# Patient Record
Sex: FEMALE | Race: WHITE | Employment: OTHER | URBAN - METROPOLITAN AREA
[De-identification: names, ages, dates, MRNs, and addresses within clinical notes are randomized per-mention and may not be internally consistent; named-entity substitution may affect disease eponyms.]

---

## 2019-04-09 ENCOUNTER — HOSPITAL ENCOUNTER (OUTPATIENT)
Age: 84
Discharge: HOME | End: 2019-04-09
Payer: MEDICARE

## 2019-04-09 ENCOUNTER — HOSPITAL ENCOUNTER (OUTPATIENT)
Dept: WOUND CARE | Age: 84
Discharge: HOME | End: 2019-04-09
Payer: MEDICARE

## 2019-04-09 VITALS
RESPIRATION RATE: 16 BRPM | TEMPERATURE: 96.9 F | HEART RATE: 60 BPM | SYSTOLIC BLOOD PRESSURE: 125 MMHG | DIASTOLIC BLOOD PRESSURE: 57 MMHG

## 2019-04-09 DIAGNOSIS — L97.912 ULCER OF LEG, CHRONIC, RIGHT, WITH FAT LAYER EXPOSED (HCC): Primary | ICD-10-CM

## 2019-04-09 DIAGNOSIS — L97.212 NON-PRESSURE CHRONIC ULCER OF RIGHT CALF WITH FAT LAYER EXPOSED (HCC): ICD-10-CM

## 2019-04-09 DIAGNOSIS — I10 ESSENTIAL HYPERTENSION, MALIGNANT: ICD-10-CM

## 2019-04-09 DIAGNOSIS — L03.116 CELLULITIS OF LEFT FOOT: ICD-10-CM

## 2019-04-09 PROBLEM — L03.115 CELLULITIS OF RIGHT FOOT: Status: ACTIVE | Noted: 2019-04-09

## 2019-04-09 LAB
ANION GAP SERPL CALCULATED.3IONS-SCNC: 12 MMOL/L (ref 3–16)
BUN BLDV-MCNC: 46 MG/DL (ref 7–20)
CALCIUM SERPL-MCNC: 10.8 MG/DL (ref 8.3–10.6)
CHLORIDE BLD-SCNC: 100 MMOL/L (ref 99–110)
CO2: 26 MMOL/L (ref 21–32)
CREAT SERPL-MCNC: 1.2 MG/DL (ref 0.6–1.2)
GFR AFRICAN AMERICAN: 51
GFR NON-AFRICAN AMERICAN: 42
GLUCOSE BLD-MCNC: 101 MG/DL (ref 70–99)
HCT VFR BLD CALC: 35.2 % (ref 36–48)
HEMOGLOBIN: 11.6 G/DL (ref 12–16)
MCH RBC QN AUTO: 31 PG (ref 26–34)
MCHC RBC AUTO-ENTMCNC: 33.1 G/DL (ref 31–36)
MCV RBC AUTO: 93.6 FL (ref 80–100)
PDW BLD-RTO: 18.2 % (ref 12.4–15.4)
PLATELET # BLD: 188 K/UL (ref 135–450)
PMV BLD AUTO: 9.7 FL (ref 5–10.5)
POTASSIUM SERPL-SCNC: 4.2 MMOL/L (ref 3.5–5.1)
RBC # BLD: 3.76 M/UL (ref 4–5.2)
SODIUM BLD-SCNC: 138 MMOL/L (ref 136–145)
WBC # BLD: 10.6 K/UL (ref 4–11)

## 2019-04-09 PROCEDURE — 99213 OFFICE O/P EST LOW 20 MIN: CPT

## 2019-04-09 PROCEDURE — 29581 APPL MULTLAYER CMPRN SYS LEG: CPT

## 2019-04-09 PROCEDURE — 80048 BASIC METABOLIC PNL TOTAL CA: CPT

## 2019-04-09 PROCEDURE — 85027 COMPLETE CBC AUTOMATED: CPT

## 2019-04-09 PROCEDURE — 36415 COLL VENOUS BLD VENIPUNCTURE: CPT

## 2019-04-09 PROCEDURE — 83036 HEMOGLOBIN GLYCOSYLATED A1C: CPT

## 2019-04-09 RX ORDER — BUSPIRONE HYDROCHLORIDE 10 MG/1
5 TABLET ORAL PRN
COMMUNITY

## 2019-04-09 RX ORDER — TRAMADOL HYDROCHLORIDE 50 MG/1
50 TABLET ORAL 2 TIMES DAILY
COMMUNITY

## 2019-04-09 RX ORDER — LIDOCAINE HYDROCHLORIDE 40 MG/ML
SOLUTION TOPICAL ONCE
Status: DISCONTINUED | OUTPATIENT
Start: 2019-04-09 | End: 2019-04-10 | Stop reason: HOSPADM

## 2019-04-09 RX ORDER — DOXYCYCLINE HYCLATE 100 MG/1
100 CAPSULE ORAL 2 TIMES DAILY
Qty: 20 CAPSULE | Refills: 0 | Status: SHIPPED | OUTPATIENT
Start: 2019-04-09 | End: 2019-04-19

## 2019-04-09 RX ORDER — LANOLIN ALCOHOL/MO/W.PET/CERES
1000 CREAM (GRAM) TOPICAL DAILY
COMMUNITY

## 2019-04-09 RX ORDER — ATORVASTATIN CALCIUM 40 MG/1
40 TABLET, FILM COATED ORAL DAILY
COMMUNITY

## 2019-04-09 RX ORDER — ATENOLOL 50 MG/1
25 TABLET ORAL DAILY
COMMUNITY

## 2019-04-09 RX ORDER — CHLORAL HYDRATE 500 MG
1000 CAPSULE ORAL 3 TIMES DAILY
COMMUNITY

## 2019-04-09 RX ORDER — SPIRONOLACTONE 25 MG/1
12.5 TABLET ORAL DAILY
COMMUNITY

## 2019-04-09 RX ORDER — FUROSEMIDE 40 MG/1
20 TABLET ORAL 2 TIMES DAILY
COMMUNITY

## 2019-04-09 RX ORDER — ALLOPURINOL 100 MG/1
100 TABLET ORAL DAILY
COMMUNITY

## 2019-04-09 NOTE — H&P
[place] [address]                     [facility], [address]                              HISTORY AND PHYSICAL    PATIENT NAME: Pranav Patel                  :        1926  MED REC NO:   [id number]                          ROOM:  ACCOUNT NO:   [de-identified]                           ADMIT DATE: 2019  PROVIDER:     Xochitl Young MD    CHIEF REASON:  Bilateral leg redness and ulcer of the right lateral leg. HISTORY OF PRESENT ILLNESS:  The patient is a 80years of age white  female who is hypertensive, no diabetes. Came with bilateral leg  swelling. Right leg also has ulcers on the right lateral leg. She is  hypertensive. ALLERGIES:  SULFA. MEDICATIONS:  Takes Tenormin 50 mg daily, Ultram 50 mg, aspirin 81 mg,  Zyloprim 100 mg, Aldactone 25 mg, omega-3 fatty acids, multivitamins,  B12, Lipitor 40 mg. PAST SURGICAL HISTORY:  She has had several heart bypasses done before  with 7 stents in place. SOCIAL HISTORY:  Does not smoke or drink. PHYSICAL EXAMINATION:  GENERAL:  Reveals a female of stated age, alert and coherent. HEENT:  Head and neck appears normal.  CARDIOVASCULAR:  Heart sounds are heard. LUNGS:  Appear clear. ABDOMEN:  Protuberant with fat but no masses. EXTREMITIES:  Peripheral pulses are not felt but heard well with  Doppler. Both legs are red and swollen. The right lateral leg also has  ulcers on it, which seems to be trophic ulcers from pressure. IMPRESSION:  Bilateral cellulitis of the legs with ulcers right lower  leg, hypertension, comorbid condition. PLAN:  1. I will keep her on Vibramycin 100 mg twice a day for 10 days and  Xeroform and Unna boot with light pressure on them. 2.  Will come back in a week for a recheck. 3.  We will also check hemoglobin A1c, CBC, and metabolic profile.         Eduarda Jenkins MD    D: 2019 10:43:15       T: 2019 11:30:54     [email or web address]  Job#: [id number]     Doc#: [id number]    CC:

## 2019-04-09 NOTE — PLAN OF CARE
Discharge instructions given. Patient verbalized understanding. Return to [facility] in 1 week.   Called/faxed orders to  Lab, pharmacy, [facility]

## 2019-04-09 NOTE — PROGRESS NOTES
Ruben Mccain  Progress Note       Naila Mcduffie  AGE: 80 y.o. GENDER: female  : 1926  TODAY'S DATE:  2019    Subjective:     Chief Complaint   Patient presents with    Wound Check     Right foot         HISTORY of PRESENT ILLNESS HPI     Naila Mcduffie is a 80 y.o. female who presents today for wound evaluation. History of Wound: Ulcer rt foot bilateral cellulitis     Wound Pain:  none  Severity:  0 / 10   Wound Type:  pressure and lymphedema  Modifying Factors:  lymphedema  Associated Signs/Symptoms:  edema, erythema and drainage        PAST MEDICAL HISTORY        Diagnosis Date    Arthritis     CAD (coronary artery disease)     Cancer ([facility] [address]     skin cancer scalp    Cerebral artery occlusion with cerebral infarction ([facility] [address]     CHF (congestive heart failure) ([facility] [address]     Glaucoma     Hyperlipidemia     Hypertension        PAST SURGICAL HISTORY    Past Surgical History:   Procedure Laterality Date    ABDOMEN SURGERY      Hernia repair    BREAST SURGERY      benign mass removed    CORONARY ARTERY BYPASS GRAFT      HYSTERECTOMY         FAMILY HISTORY    History reviewed. No pertinent family history. SOCIAL HISTORY    Social History     Tobacco Use    Smoking status: Former Smoker     Last attempt to quit:      Years since quittin.3    Smokeless tobacco: Never Used   Substance Use Topics    Alcohol use: Not on file    Drug use: Not on file       ALLERGIES    Allergies   Allergen Reactions    Codeine     Sulfa Antibiotics        MEDICATIONS    Current Outpatient Medications on File Prior to Encounter   Medication Sig Dispense Refill    traMADol (ULTRAM) 50 MG tablet Take 50 mg by mouth 2 times daily.       atenolol (TENORMIN) 50 MG tablet Take 50 mg by mouth daily      furosemide (LASIX) 40 MG tablet Take 40 mg by mouth 2 times daily      aspirin 81 MG tablet Take 81 mg by mouth daily      allopurinol (ZYLOPRIM) 100 MG tablet Take 100 mg by mouth daily      spironolactone (ALDACTONE) 25 MG tablet Take 12.5 mg by mouth daily      Omega-3 Fatty Acids (FISH OIL) 1000 MG CAPS Take 1,000 mg by mouth 3 times daily      Multiple Vitamins-Minerals (CENTRUM SILVER 50+WOMEN) TABS Take by mouth      vitamin B-12 (CYANOCOBALAMIN) 1000 MCG tablet Take 1,000 mcg by mouth daily      atorvastatin (LIPITOR) 40 MG tablet Take 40 mg by mouth daily      busPIRone (BUSPAR) 10 MG tablet Take 10 mg by mouth as needed (anxiety)       No current facility-administered medications on file prior to encounter. REVIEW OF SYSTEMS    Pertinent items are noted in HPI.       Objective:      BP (!) 125/57   Pulse 60   Temp 96.9 °F (36.1 °C) (Oral)   Resp 16     PHYSICAL EXAM    Both legs swollen red ulcers rt leg Pulses heard well with doppler       Assessment:     Patient Active Problem List   Diagnosis    Cellulitis of right foot    Cellulitis of left foot    Essential hypertension, malignant    Non-pressure chronic ulcer of right calf with fat layer exposed ([facility] [address]       Wound 04/09/19 Foot Right;Lateral #1 (Active)   Wound Image   4/9/2019  9:55 AM   Wound Venous 4/9/2019  9:55 AM   Wound Cleansed Wound cleanser 4/9/2019  9:55 AM   Wound Length (cm) 1 cm 4/9/2019  9:55 AM   Wound Width (cm) 0.5 cm 4/9/2019  9:55 AM   Wound Depth (cm) 0.1 cm 4/9/2019  9:55 AM   Wound Surface Area (cm^2) 0.5 cm^2 4/9/2019  9:55 AM   Wound Volume (cm^3) 0.05 cm^3 4/9/2019  9:55 AM   Wound Assessment Pink 4/9/2019  9:55 AM   Drainage Amount Small 4/9/2019  9:55 AM   Drainage Description Serosanguinous 4/9/2019  9:55 AM   Odor None 4/9/2019  9:55 AM   Mckayla-wound Assessment Excoriated;Red 4/9/2019  9:55 AM   [place]%Wound Bed 100 4/9/2019  9:55 AM   Red%Wound Bed 0 4/9/2019  9:55 AM   Yellow%Wound Bed 0 4/9/2019  9:55 AM   Black%Wound Bed 0 4/9/2019  9:55 AM   Purple%Wound Bed 0 4/9/2019  9:55 AM   Other%Wound Bed 0 4/9/2019  9:55 AM   Number of days: 0           Plan:       Discharge Treatment Xeroform and padding bilateral unna boot.  Vibramycin 100mg Bid for 10 days RT in 1 week        Written Patient Discharge Instructions Given            Electronically signed by Nelly Mendoza MD on 4/9/2019 at 10:33 AM

## 2019-04-09 NOTE — PROGRESS NOTES
Compression Application   Below Knee    NAME:  Sylvester Ross  YOB: 1926  MEDICAL RECORD NUMBER:  [id number]  DATE:  4/9/2019       Applied moisturizing agent to dry skin as needed. Appied primary and secondary dressing as ordered     Applied  2 layer wrap from toes to knee overlapping each time. Applied cast padding and coban from toes to below the knee. Instructed patient/caregiver to keep dressing dry and intact. DO NOT REMOVE DRESSING. Instructed pt/family/caregiver to report excessive draining, loose bandage, wet dressing, severe pain or tingling in toes. Applied Compression dressing below the knee to Bilateral lower leg(s)      Compression wrap(s) were applied per  Guidelines.      Electronically signed by Daniella Grady RN on 4/9/2019 at 10:58 AM

## 2019-04-10 LAB
ESTIMATED AVERAGE GLUCOSE: 157.1 MG/DL
HBA1C MFR BLD: 7.1 %

## 2019-04-16 ENCOUNTER — HOSPITAL ENCOUNTER (OUTPATIENT)
Dept: WOUND CARE | Age: 84
Discharge: HOME | End: 2019-04-16
Attending: SURGERY
Payer: MEDICARE

## 2019-04-16 VITALS
SYSTOLIC BLOOD PRESSURE: 122 MMHG | OXYGEN SATURATION: 96 % | HEART RATE: 61 BPM | RESPIRATION RATE: 16 BRPM | TEMPERATURE: 96.8 F | DIASTOLIC BLOOD PRESSURE: 58 MMHG

## 2019-04-16 PROCEDURE — 29581 APPL MULTLAYER CMPRN SYS LEG: CPT

## 2019-04-16 PROCEDURE — 99213 OFFICE O/P EST LOW 20 MIN: CPT | Performed by: SURGERY

## 2019-04-16 RX ORDER — DORZOLAMIDE HCL 20 MG/ML
1 SOLUTION/ DROPS OPHTHALMIC 2 TIMES DAILY
COMMUNITY

## 2019-04-16 RX ORDER — LIDOCAINE 50 MG/G
OINTMENT TOPICAL ONCE
Status: DISCONTINUED | OUTPATIENT
Start: 2019-04-16 | End: 2019-04-17 | Stop reason: HOSPADM

## 2019-04-16 RX ORDER — LATANOPROST 50 UG/ML
1 SOLUTION/ DROPS OPHTHALMIC NIGHTLY
COMMUNITY

## 2019-04-16 RX ORDER — CLOBETASOL PROPIONATE 0.5 MG/G
CREAM TOPICAL PRN
COMMUNITY

## 2019-04-16 NOTE — PROGRESS NOTES
Ruben Mccain  Progress Note       Libby Cruz  AGE: 80 y.o. GENDER: female  : 1926  TODAY'S DATE:  2019    Subjective:     Chief Complaint   Patient presents with    Wound Check     RLE         HISTORY of PRESENT ILLNESS HPI     Libby Cruz is a 80 y.o. female who presents today for wound evaluation. History of Wound: venous ulcer    Wound Pain:  mild  Severity:  2 / 10   Wound Type:  venous  Modifying Factors:  venous stasis  Associated Signs/Symptoms:  none        PAST MEDICAL HISTORY        Diagnosis Date    Arthritis     CAD (coronary artery disease)     Cancer (HCC)     skin cancer scalp    Cerebral artery occlusion with cerebral infarction ([facility] [address]     CHF (congestive heart failure) ([facility] [address]     Glaucoma     Hyperlipidemia     Hypertension        PAST SURGICAL HISTORY    Past Surgical History:   Procedure Laterality Date    ABDOMEN SURGERY      Hernia repair    BREAST SURGERY      benign mass removed    CORONARY ARTERY BYPASS GRAFT      HYSTERECTOMY         FAMILY HISTORY    No family history on file.     SOCIAL HISTORY    Social History     Tobacco Use    Smoking status: Former Smoker     Last attempt to quit:      Years since quittin.3    Smokeless tobacco: Never Used   Substance Use Topics    Alcohol use: Not on file    Drug use: Not on file       ALLERGIES    Allergies   Allergen Reactions    Codeine     Sulfa Antibiotics        MEDICATIONS    Current Outpatient Medications on File Prior to Encounter   Medication Sig Dispense Refill    brimonidine (ALPHAGAN P) 0.1 % SOLN Place 1 drop into both eyes 2 times daily      dorzolamide (TRUSOPT) 2 % ophthalmic solution Place 1 drop into both eyes 2 times daily      latanoprost (XALATAN) 0.005 % ophthalmic solution Place 1 drop into both eyes nightly      polyethyl glycol-propyl glycol 0.4-0.3 % (SYSTANE) 0.4-0.3 % ophthalmic solution 1 drop as needed for Dry Eyes      econazole nitrate 1 % cream Apply topically 2 times daily Apply topically daily.  clobetasol (TEMOVATE) 0.05 % cream Apply topically as needed Apply topically 2 times daily.  traMADol (ULTRAM) 50 MG tablet Take 50 mg by mouth 2 times daily.  atenolol (TENORMIN) 50 MG tablet Take 25 mg by mouth daily       furosemide (LASIX) 40 MG tablet Take 20 mg by mouth 2 times daily       aspirin 81 MG tablet Take 81 mg by mouth daily      allopurinol (ZYLOPRIM) 100 MG tablet Take 100 mg by mouth daily      spironolactone (ALDACTONE) 25 MG tablet Take 12.5 mg by mouth daily      busPIRone (BUSPAR) 10 MG tablet Take 5 mg by mouth as needed (anxiety)       Omega-3 Fatty Acids (FISH OIL) 1000 MG CAPS Take 1,000 mg by mouth 3 times daily      Multiple Vitamins-Minerals (CENTRUM SILVER 50+WOMEN) TABS Take by mouth      vitamin B-12 (CYANOCOBALAMIN) 1000 MCG tablet Take 1,000 mcg by mouth daily      atorvastatin (LIPITOR) 40 MG tablet Take 40 mg by mouth daily      doxycycline hyclate (VIBRAMYCIN) 100 MG capsule Take 1 capsule by mouth 2 times daily for 10 days 20 capsule 0     No current facility-administered medications on file prior to encounter. REVIEW OF SYSTEMS    Pertinent items are noted in HPI.       Objective:      BP (!) 122/58   Pulse 61   Temp 96.8 °F (36 °C) (Oral)   Resp 16   SpO2 96%     PHYSICAL EXAM    General Appearance: alert and oriented to person, place and time, well-developed and well-nourished, in no acute distress  Skin: warm and dry, no rash or erythema  Head: normocephalic and atraumatic  Neck: neck supple and non tender without mass, no thyromegaly or thyroid nodules, no cervical lymphadenopathy   Abdomen: soft, non-tender, non-distended, normal bowel sounds, no masses or organomegaly  Swelling under good control    Assessment:     Patient Active Problem List   Diagnosis    Cellulitis of right foot    Cellulitis of left foot    Essential hypertension, malignant    Non-pressure chronic ulcer of right calf with fat layer exposed ([facility] [address]       Wound 04/09/19 Foot Right;Lateral #1 (Active)   Wound Image   4/9/2019  9:55 AM   Wound Venous 4/16/2019 10:31 AM   Wound Cleansed Rinsed/Irrigated with saline; Wound cleanser 4/16/2019 10:31 AM   Wound Length (cm) 1.3 cm 4/16/2019 10:31 AM   Wound Width (cm) 0.9 cm 4/16/2019 10:31 AM   Wound Depth (cm) 0.1 cm 4/16/2019 10:31 AM   Wound Surface Area (cm^2) 1.17 cm^2 4/16/2019 10:31 AM   Change in Wound Size % (l*w) -134 4/16/2019 10:31 AM   Wound Volume (cm^3) 0.12 cm^3 4/16/2019 10:31 AM   Wound Healing % -140 4/16/2019 10:31 AM   Wound Assessment Pink;Yellow 4/16/2019 10:31 AM   Drainage Amount Moderate 4/16/2019 10:31 AM   Drainage Description Serosanguinous 4/16/2019 10:31 AM   Odor None 4/16/2019 10:31 AM   Mckayla-wound Assessment Excoriated;Pink 4/16/2019 10:31 AM   [place]%Wound Bed 80 4/16/2019 10:31 AM   Red%Wound Bed 0 4/16/2019 10:31 AM   Yellow%Wound Bed 20 4/16/2019 10:31 AM   Black%Wound Bed 0 4/16/2019 10:31 AM   Purple%Wound Bed 0 4/16/2019 10:31 AM   Other%Wound Bed 0 4/16/2019 10:31 AM   Number of days: 9    80year-old female with venous ulcerations of the right ankle. Her swelling is under good control with a Coban light. The ulcers are clean. Plan:     The patient will wear double layer Tubigrip on the left leg and a new Coban light was placed on the right leg. Follow up in the wound center in 1 week.     Discharge Treatment          Written Patient Discharge Instructions Given            Electronically signed by Catalina Riggins MD on 4/16/2019 at 11:20 AM

## 2019-04-16 NOTE — PLAN OF CARE
Discharge instructions given. Patient verbalized understanding. Return to [facility] in 1 week.

## 2019-04-23 ENCOUNTER — HOSPITAL ENCOUNTER (OUTPATIENT)
Dept: WOUND CARE | Age: 84
Discharge: HOME | End: 2019-04-23
Payer: MEDICARE

## 2019-04-23 VITALS
TEMPERATURE: 97.9 F | RESPIRATION RATE: 16 BRPM | DIASTOLIC BLOOD PRESSURE: 50 MMHG | HEART RATE: 61 BPM | SYSTOLIC BLOOD PRESSURE: 106 MMHG

## 2019-04-23 PROCEDURE — 29580 STRAPPING UNNA BOOT: CPT

## 2019-04-23 NOTE — PLAN OF CARE
Discharge instructions given. Patient verbalized understanding. Return to [address] [place] [address],[address] in 1 week.   Called/faxed orders to  [facility]

## 2019-04-30 ENCOUNTER — HOSPITAL ENCOUNTER (OUTPATIENT)
Dept: WOUND CARE | Age: 84
Discharge: HOME | End: 2019-04-30
Payer: MEDICARE

## 2019-04-30 VITALS — SYSTOLIC BLOOD PRESSURE: 138 MMHG | DIASTOLIC BLOOD PRESSURE: 66 MMHG | HEART RATE: 60 BPM | RESPIRATION RATE: 16 BRPM

## 2019-04-30 PROCEDURE — 29580 STRAPPING UNNA BOOT: CPT

## 2019-04-30 NOTE — PROGRESS NOTES
Ruben Mccain  Progress Note       Amanda Prasad  AGE: 80 y.o. GENDER: female  : 1926  TODAY'S DATE:  2019    Subjective:     Chief Complaint   Patient presents with    Wound Check     Right foot         HISTORY of PRESENT ILLNESS HPI     Amanda Prasad is a 80 y.o. female who presents today for wound evaluation. History of Wound: Rt foot ulcer     Wound Pain:  constant  Severity:  2 / 10   Wound Type:  traumatic  Modifying Factors:  lymphedema and arterial insufficiency  Associated Signs/Symptoms:  erythema and drainage        PAST MEDICAL HISTORY        Diagnosis Date    Arthritis     CAD (coronary artery disease)     Cancer ([place] [address]     skin cancer scalp    Cerebral artery occlusion with cerebral infarction ([facility] [address]     CHF (congestive heart failure) ([facility] [address]     Glaucoma     Hyperlipidemia     Hypertension        PAST SURGICAL HISTORY    Past Surgical History:   Procedure Laterality Date    ABDOMEN SURGERY      Hernia repair    BREAST SURGERY      benign mass removed    CORONARY ARTERY BYPASS GRAFT      HYSTERECTOMY         FAMILY HISTORY    History reviewed. No pertinent family history.     SOCIAL HISTORY    Social History     Tobacco Use    Smoking status: Former Smoker     Last attempt to quit:      Years since quittin.3    Smokeless tobacco: Never Used   Substance Use Topics    Alcohol use: Not on file    Drug use: Not on file       ALLERGIES    Allergies   Allergen Reactions    Codeine     Sulfa Antibiotics        MEDICATIONS    Current Outpatient Medications on File Prior to Encounter   Medication Sig Dispense Refill    brimonidine (ALPHAGAN P) 0.1 % SOLN Place 1 drop into both eyes 2 times daily      dorzolamide (TRUSOPT) 2 % ophthalmic solution Place 1 drop into both eyes 2 times daily      latanoprost (XALATAN) 0.005 % ophthalmic solution Place 1 drop into both eyes nightly      econazole nitrate 1 % cream Apply topically 2 times daily Wound Volume (cm^3) 0.04 cm^3 4/30/2019  2:28 PM   Wound Healing % 20 4/30/2019  2:28 PM   Wound Assessment Red 4/30/2019  2:28 PM   Drainage Amount Moderate 4/30/2019  2:28 PM   Drainage Description Serosanguinous 4/30/2019  2:28 PM   Odor None 4/30/2019  2:28 PM   Mckayla-wound Assessment Pink 4/30/2019  2:28 PM   [place]%Wound Bed 0 4/30/2019  2:28 PM   Red%Wound Bed 100 4/30/2019  2:28 PM   Yellow%Wound Bed 0 4/30/2019  2:28 PM   Black%Wound Bed 0 4/30/2019  2:28 PM   Purple%Wound Bed 0 4/30/2019  2:28 PM   Other%Wound Bed 0 4/30/2019  2:28 PM   Number of days: 21           Plan:       Discharge Treatment  Xeroform Padding and unna boot Rt in 1 week        Written Patient Discharge Instructions Given            Electronically signed by Xochitl Young MD on 4/30/2019 at 2:43 PM

## 2019-05-07 ENCOUNTER — HOSPITAL ENCOUNTER (OUTPATIENT)
Dept: WOUND CARE | Age: 84
Discharge: HOME | End: 2019-05-07
Attending: SURGERY
Payer: MEDICARE

## 2019-05-07 VITALS — DIASTOLIC BLOOD PRESSURE: 66 MMHG | SYSTOLIC BLOOD PRESSURE: 129 MMHG | RESPIRATION RATE: 16 BRPM | HEART RATE: 56 BPM

## 2019-05-07 PROCEDURE — 99213 OFFICE O/P EST LOW 20 MIN: CPT | Performed by: SURGERY

## 2019-05-07 PROCEDURE — 29580 STRAPPING UNNA BOOT: CPT

## 2019-05-07 RX ORDER — LIDOCAINE 40 MG/G
CREAM TOPICAL ONCE
Status: DISCONTINUED | OUTPATIENT
Start: 2019-05-07 | End: 2019-05-08 | Stop reason: HOSPADM

## 2019-05-07 NOTE — FLOWSHEET NOTE
05/07/19 1130   Anesthetic   Anesthetic Other (comment)   Right Leg Edema Point of Measurement   Great toe to forefoot 12 cm   Heel to ankle 9 cm   Heel to calf 33   Leg circumference 35 cm  (38 cm from heel to back of knww)   Ankle circumference 19.5 cm   Foot circumference 24 cm   Left Leg Edema Point of Measurement   Great toe to forefoot 12 cm   Heel to ankle 9 cm   Heel to calf 33   Leg circumference 40.5 cm  (38 cm from to behind knee)   Ankle circumference 20 cm   Foot circumference 23 cm   Wound 04/09/19 Foot Right;Lateral #1   Date First Assessed/Time First Assessed: 04/09/19 1007   Primary Wound Type: Venous Ulcer  Location: Foot  Wound Location Orientation: Right;Lateral  Wound Description (Comments): #1   Wound Venous   Wound Cleansed Wound cleanser   Wound Length (cm) 1.4 cm   Wound Width (cm) 4 cm   Wound Depth (cm) 0.1 cm   Wound Surface Area (cm^2) 5.6 cm^2   Change in Wound Size % (l*w) -1020   Wound Volume (cm^3) 0.56 cm^3   Wound Healing % -1020   Wound Assessment Pink;Granulation tissue   Drainage Amount Moderate   Drainage Description Serosanguinous   Odor Mild   Mckayla-wound Assessment Marian[place]   Pink%Wound Bed 100   Red%Wound Bed 0   Yellow%Wound Bed 0   Black%Wound Bed 0   Purple%Wound Bed 0

## 2019-05-07 NOTE — PLAN OF CARE
Discharge instructions given. Patient verbalized understanding. Return to [facility] in 1 week.   Faxed to [facility]

## 2019-05-07 NOTE — PROGRESS NOTES
Ruben Mccain  Progress Note       Libby Cruz  AGE: 80 y.o. GENDER: female  : 1926  TODAY'S DATE:  2019    Subjective:     Chief Complaint   Patient presents with    Wound Check     Follow up wound          HISTORY of PRESENT ILLNESS HPI     Libby Cruz is a 80 y.o. female who presents today for wound evaluation. History of Wound: venous ulcer R foot    Wound Pain:  mild  Severity:  2 / 10   Wound Type:  venous  Modifying Factors:  venous stasis  Associated Signs/Symptoms:  none        PAST MEDICAL HISTORY        Diagnosis Date    Arthritis     CAD (coronary artery disease)     Cancer (HCC)     skin cancer scalp    Cerebral artery occlusion with cerebral infarction ([facility] [address]     CHF (congestive heart failure) ([facility] [address]     Glaucoma     Hyperlipidemia     Hypertension        PAST SURGICAL HISTORY    Past Surgical History:   Procedure Laterality Date    ABDOMEN SURGERY      Hernia repair    BREAST SURGERY      benign mass removed    CORONARY ARTERY BYPASS GRAFT      HYSTERECTOMY         FAMILY HISTORY    No family history on file.     SOCIAL HISTORY    Social History     Tobacco Use    Smoking status: Former Smoker     Last attempt to quit:      Years since quittin.3    Smokeless tobacco: Never Used   Substance Use Topics    Alcohol use: Not on file    Drug use: Not on file       ALLERGIES    Allergies   Allergen Reactions    Codeine     Sulfa Antibiotics        MEDICATIONS    Current Outpatient Medications on File Prior to Encounter   Medication Sig Dispense Refill    brimonidine (ALPHAGAN P) 0.1 % SOLN Place 1 drop into both eyes 2 times daily      dorzolamide (TRUSOPT) 2 % ophthalmic solution Place 1 drop into both eyes 2 times daily      latanoprost (XALATAN) 0.005 % ophthalmic solution Place 1 drop into both eyes nightly      polyethyl glycol-propyl glycol 0.4-0.3 % (SYSTANE) 0.4-0.3 % ophthalmic solution 1 drop as needed for Dry Eyes      econazole nitrate 1 % cream Apply topically 2 times daily Apply topically daily.  clobetasol (TEMOVATE) 0.05 % cream Apply topically as needed Apply topically 2 times daily.  traMADol (ULTRAM) 50 MG tablet Take 50 mg by mouth 2 times daily.  atenolol (TENORMIN) 50 MG tablet Take 25 mg by mouth daily       furosemide (LASIX) 40 MG tablet Take 20 mg by mouth 2 times daily       aspirin 81 MG tablet Take 81 mg by mouth daily      allopurinol (ZYLOPRIM) 100 MG tablet Take 100 mg by mouth daily      spironolactone (ALDACTONE) 25 MG tablet Take 12.5 mg by mouth daily      busPIRone (BUSPAR) 10 MG tablet Take 5 mg by mouth as needed (anxiety)       Omega-3 Fatty Acids (FISH OIL) 1000 MG CAPS Take 1,000 mg by mouth 3 times daily      Multiple Vitamins-Minerals (CENTRUM SILVER 50+WOMEN) TABS Take by mouth      vitamin B-12 (CYANOCOBALAMIN) 1000 MCG tablet Take 1,000 mcg by mouth daily      atorvastatin (LIPITOR) 40 MG tablet Take 40 mg by mouth daily       No current facility-administered medications on file prior to encounter. REVIEW OF SYSTEMS    Pertinent items are noted in HPI.       Objective:      /66   Pulse 56   Resp 16     PHYSICAL EXAM    General Appearance: alert and oriented to person, place and time, well-developed and well-nourished, in no acute distress  Skin: warm and dry, no rash or erythema  Head: normocephalic and atraumatic  Neck: neck supple and non tender without mass, no thyromegaly or thyroid nodules, no cervical lymphadenopathy   Abdomen: soft, non-tender, non-distended, normal bowel sounds, no masses or organomegaly  Clean ulceration of the lateral aspect of the R foot    Assessment:     Patient Active Problem List   Diagnosis    Cellulitis of right foot    Cellulitis of left foot    Essential hypertension, malignant    Non-pressure chronic ulcer of right calf with fat layer exposed ([facility] [address]       Wound 04/09/19 Foot Right;Lateral #1 (Active)   Wound Image 4/9/2019  9:55 AM   Wound Venous 5/7/2019 11:30 AM   Wound Cleansed Wound cleanser 5/7/2019 11:30 AM   Wound Length (cm) 1.4 cm 5/7/2019 11:30 AM   Wound Width (cm) 4 cm 5/7/2019 11:30 AM   Wound Depth (cm) 0.1 cm 5/7/2019 11:30 AM   Wound Surface Area (cm^2) 5.6 cm^2 5/7/2019 11:30 AM   Change in Wound Size % (l*w) -1020 5/7/2019 11:30 AM   Wound Volume (cm^3) 0.56 cm^3 5/7/2019 11:30 AM   Wound Healing % -1020 5/7/2019 11:30 AM   Wound Assessment Pink;Granulation tissue 5/7/2019 11:30 AM   Drainage Amount Moderate 5/7/2019 11:30 AM   Drainage Description Serosanguinous 5/7/2019 11:30 AM   Odor Mild 5/7/2019 11:30 AM   Mckayla-wound Assessment Pink 5/7/2019 11:30 AM   [place]%Wound Bed 100 5/7/2019 11:30 AM   Red%Wound Bed 0 5/7/2019 11:30 AM   Yellow%Wound Bed 0 5/7/2019 11:30 AM   Black%Wound Bed 0 5/7/2019 11:30 AM   Purple%Wound Bed 0 5/7/2019 11:30 AM   Other%Wound Bed 0 4/30/2019  2:28 PM   Number of days: 29      80year old female with a probable venous ulcer of the R lateral foot. Not making much progress. A new Unna boot was placed. Plan:     Follow up in one week. Would consider usage of a skin substitute.   Discharge Treatment          Written Patient Discharge Instructions Given            Electronically signed by Penelope Kanner, MD on 5/7/2019 at 11:45 AM

## 2019-05-14 ENCOUNTER — HOSPITAL ENCOUNTER (OUTPATIENT)
Dept: WOUND CARE | Age: 84
Discharge: HOME | End: 2019-05-14
Attending: SURGERY
Payer: MEDICARE

## 2019-05-14 DIAGNOSIS — L97.212 NON-PRESSURE CHRONIC ULCER OF RIGHT CALF WITH FAT LAYER EXPOSED (HCC): Primary | ICD-10-CM

## 2019-05-14 DIAGNOSIS — I83.009 VENOUS ULCER (HCC): ICD-10-CM

## 2019-05-14 DIAGNOSIS — L97.909 VENOUS ULCER (HCC): ICD-10-CM

## 2019-05-14 PROCEDURE — 99213 OFFICE O/P EST LOW 20 MIN: CPT

## 2019-05-14 PROCEDURE — 99213 OFFICE O/P EST LOW 20 MIN: CPT | Performed by: SURGERY

## 2019-05-14 RX ORDER — LIDOCAINE 40 MG/G
CREAM TOPICAL ONCE
Status: DISCONTINUED | OUTPATIENT
Start: 2019-05-14 | End: 2019-05-15 | Stop reason: HOSPADM

## 2019-05-14 NOTE — PROGRESS NOTES
Ruben Mccain  Progress Note       Libby Cruz  AGE: 80 y.o. GENDER: female  : 1926  TODAY'S DATE:  2019    Subjective:     Chief Complaint   Patient presents with    Wound Check     both legs F/U         HISTORY of PRESENT ILLNESS HPI     Libby Cruz is a 80 y.o. female who presents today for wound evaluation. History of Wound: venous ulcer    Wound Pain:  none  Severity:  0 / 10   Wound Type:  venous  Modifying Factors:  venous stasis  Associated Signs/Symptoms:  none        PAST MEDICAL HISTORY        Diagnosis Date    Arthritis     CAD (coronary artery disease)     Cancer (HCC)     skin cancer scalp    [facility]     CHF [facility]congestive heart failure) [facility]     Glaucoma     Hyperlipidemia     Hypertension        PAST SURGICAL HISTORY    Past Surgical History:   Procedure Laterality Date    ABDOMEN SURGERY      Hernia repair    BREAST SURGERY      benign mass removed    CORONARY ARTERY BYPASS GRAFT      HYSTERECTOMY         FAMILY HISTORY    History reviewed. No pertinent family history.     SOCIAL HISTORY    Social History     Tobacco Use    Smoking status: Former Smoker     Last attempt to quit:      Years since quittin.4    Smokeless tobacco: Never Used   Substance Use Topics    Alcohol use: Not on file    Drug use: Not on file       ALLERGIES    Allergies   Allergen Reactions    Codeine     Sulfa Antibiotics        MEDICATIONS    Current Outpatient Medications on File Prior to Encounter   Medication Sig Dispense Refill    brimonidine (ALPHAGAN P) 0.1 % SOLN Place 1 drop into both eyes 2 times daily      dorzolamide (TRUSOPT) 2 % ophthalmic solution Place 1 drop into both eyes 2 times daily      latanoprost (XALATAN) 0.005 % ophthalmic solution Place 1 drop into both eyes nightly      polyethyl glycol-propyl glycol 0.4-0.3 % (SYSTANE) 0.4-0.3 % ophthalmic solution 1 drop as needed for Dry Eyes      econazole nitrate 1 % cream Apply topically 2 times daily Apply topically daily.  clobetasol (TEMOVATE) 0.05 % cream Apply topically as needed Apply topically 2 times daily.  traMADol (ULTRAM) 50 MG tablet Take 50 mg by mouth 2 times daily.  atenolol (TENORMIN) 50 MG tablet Take 25 mg by mouth daily       furosemide (LASIX) 40 MG tablet Take 20 mg by mouth 2 times daily       aspirin 81 MG tablet Take 81 mg by mouth daily      allopurinol (ZYLOPRIM) 100 MG tablet Take 100 mg by mouth daily      spironolactone (ALDACTONE) 25 MG tablet Take 12.5 mg by mouth daily      busPIRone (BUSPAR) 10 MG tablet Take 5 mg by mouth as needed (anxiety)       Omega-3 Fatty Acids (FISH OIL) 1000 MG CAPS Take 1,000 mg by mouth 3 times daily      Multiple Vitamins-Minerals (CENTRUM SILVER 50+WOMEN) TABS Take by mouth      vitamin B-12 (CYANOCOBALAMIN) 1000 MCG tablet Take 1,000 mcg by mouth daily      atorvastatin (LIPITOR) 40 MG tablet Take 40 mg by mouth daily       No current facility-administered medications on file prior to encounter. REVIEW OF SYSTEMS    Pertinent items are noted in HPI. Objective: There were no vitals taken for this visit.     PHYSICAL EXAM    General Appearance: alert and oriented to person, place and time, well-developed and well-nourished, in no acute distress  Skin: warm and dry, no rash or erythema  Head: normocephalic and atraumatic  Neck: neck supple and non tender without mass, no thyromegaly or thyroid nodules, no cervical lymphadenopathy   Abdomen: soft, non-tender, non-distended, normal bowel sounds, no masses or organomegaly      Assessment:     Patient Active Problem List   Diagnosis    Cellulitis of right foot    Cellulitis of left foot    Essential hypertension, malignant    Non-pressure chronic ulcer of right calf with fat layer exposed ([facility] [address] [facility]   ulcer clean    Wound 04/09/19 Foot Right;Lateral #1 (Active)   Wound Image   4/9/2019  9:55 AM Wound Venous 5/14/2019 11:52 AM   Wound Cleansed Wound cleanser 5/14/2019 11:52 AM   Wound Length (cm) 1.6 cm 5/14/2019 11:52 AM   Wound Width (cm) 4 cm 5/14/2019 11:52 AM   Wound Depth (cm) 0.1 cm 5/14/2019 11:52 AM   Wound Surface Area (cm^2) 6.4 cm^2 5/14/2019 11:52 AM   Change in Wound Size % (l*w) -1180 5/14/2019 11:52 AM   Wound Volume (cm^3) 0.64 cm^3 5/14/2019 11:52 AM   Wound Healing % -1180 5/14/2019 11:52 AM   Wound Assessment Granulation tissue; Red 5/14/2019 11:52 AM   Drainage Amount Moderate 5/14/2019 11:52 AM   Drainage Description Tan 5/14/2019 11:52 AM   Odor Mild 5/7/2019 11:30 AM   Mckayla-wound Assessment Dry;Pink 5/14/2019 11:52 AM   [place]%Wound Bed 0 5/14/2019 11:52 AM   Red%Wound Bed 100 5/14/2019 11:52 AM   Yellow%Wound Bed 0 5/14/2019 11:52 AM   Black%Wound Bed 0 5/14/2019 11:52 AM   Purple%Wound Bed 0 5/14/2019 11:52 AM   Other%Wound Bed 0 5/14/2019 11:52 AM   Number of days: 28    80year-old female with venous stasis who is here for follow-up of a small right pretibial ulceration as well as a right lateral foot ulceration. Both ulcers are clean and progressing well. Plan: Adaptic followed by dry dressing and double layer Tubigrip. Change every other day. Follow-up in 1 week.   Discharge Treatment          Written Patient Discharge Instructions Given            Electronically signed by Johnathon Mc MD on 5/14/2019 at 12:15 PM

## 2019-05-14 NOTE — PLAN OF CARE
Discharge instructions given. Patient verbalized understanding. Return to [address] [place] [address],[address] in 1 week.   Called/faxed orders to [facility]

## 2019-05-21 ENCOUNTER — HOSPITAL ENCOUNTER (OUTPATIENT)
Dept: WOUND CARE | Age: 84
Discharge: HOME | End: 2019-05-21
Payer: MEDICARE

## 2019-05-21 VITALS — SYSTOLIC BLOOD PRESSURE: 142 MMHG | HEART RATE: 60 BPM | RESPIRATION RATE: 16 BRPM | DIASTOLIC BLOOD PRESSURE: 73 MMHG

## 2019-05-21 PROCEDURE — 99213 OFFICE O/P EST LOW 20 MIN: CPT

## 2019-05-21 NOTE — PROGRESS NOTES
Ruben Mccain  Progress Note       Kaylie Flynn  AGE: 80 y.o. GENDER: female  : 1926  TODAY'S DATE:  2019    Subjective:     Chief Complaint   Patient presents with    Wound Check     Leg wound         HISTORY of PRESENT ILLNESS HPI     Kaylie Flynn is a 80 y.o. female who presents today for wound evaluation. History of Wound: Rt foot and letral leg ulcer     Wound Pain:  none  Severity:  0 / 10   Wound Type:  pressure  Modifying Factors:  lymphedema, chronic pressure and decreased mobility  Associated Signs/Symptoms:  edema, erythema and drainage        PAST MEDICAL HISTORY        Diagnosis Date    Arthritis     CAD (coronary artery disease)     Cancer (HCC)     skin cancer scalp    [facility] occlusion with [facility] infarction ([facility]     CHF [facility]congestive heart failure) ([facility]     Glaucoma     Hyperlipidemia     Hypertension        PAST SURGICAL HISTORY    Past Surgical History:   Procedure Laterality Date    ABDOMEN SURGERY      Hernia repair    BREAST SURGERY      benign mass removed    CORONARY ARTERY BYPASS GRAFT      HYSTERECTOMY         FAMILY HISTORY    History reviewed. No pertinent family history.     SOCIAL HISTORY    Social History     Tobacco Use    Smoking status: Former Smoker     Last attempt to quit:      Years since quittin.4    Smokeless tobacco: Never Used   Substance Use Topics    Alcohol use: Not on file    Drug use: Not on file       ALLERGIES    Allergies   Allergen Reactions    Codeine     Sulfa Antibiotics        MEDICATIONS    Current Outpatient Medications on File Prior to Encounter   Medication Sig Dispense Refill    brimonidine (ALPHAGAN P) 0.1 % SOLN Place 1 drop into both eyes 2 times daily      dorzolamide (TRUSOPT) 2 % ophthalmic solution Place 1 drop into both eyes 2 times daily      latanoprost (XALATAN) 0.005 % ophthalmic solution Place 1 drop into both eyes nightly      polyethyl glycol-propyl glycol

## 2019-05-21 NOTE — PLAN OF CARE
Compression Application   Below Knee    NAME:  Naila Mcduffie  YOB: 1926  MEDICAL RECORD NUMBER:  [id number]  DATE:  5/21/2019       Applied moisturizing agent to dry skin as needed. Appied primary and secondary dressing as ordered     Applied  Unna Boot wrap from toes to knee overlapping each time. Applied cast padding and coban from toes to below the knee. Instructed patient/caregiver to keep dressing dry and intact. DO NOT REMOVE DRESSING. Instructed pt/family/caregiver to report excessive draining, loose bandage, wet dressing, severe pain or tingling in toes. Applied Compression dressing below the knee to Right lower leg(s)      Compression wrap(s) were applied per  Guidelines.      Electronically signed by Sulaiman Castelan RN on 5/21/2019 at 4:08 PM

## 2019-05-28 ENCOUNTER — HOSPITAL ENCOUNTER (OUTPATIENT)
Dept: WOUND CARE | Age: 84
Discharge: HOME | End: 2019-05-28
Payer: MEDICARE

## 2019-05-28 VITALS — RESPIRATION RATE: 16 BRPM | HEART RATE: 68 BPM | DIASTOLIC BLOOD PRESSURE: 70 MMHG | SYSTOLIC BLOOD PRESSURE: 121 MMHG

## 2019-05-28 PROCEDURE — 99213 OFFICE O/P EST LOW 20 MIN: CPT

## 2019-05-28 NOTE — PROGRESS NOTES
Ruben Mccain  Progress Note       William Velasquez  AGE: 80 y.o. GENDER: female  : 1926  TODAY'S DATE:  2019    Subjective:     Chief Complaint   Patient presents with    Wound Check     Right foot         HISTORY of PRESENT ILLNESS HPI     William Velasquez is a 80 y.o. female who presents today for wound evaluation. History of Wound: Rt foot ulcer     Wound Pain:  constant  Severity:  2 / 10   Wound Type:  pressure  Modifying Factors:  lymphedema, chronic pressure, decreased mobility and shear force  Associated Signs/Symptoms:  edema, erythema and drainage        PAST MEDICAL HISTORY        Diagnosis Date    Arthritis     CAD (coronary artery disease)     Cancer (HCC)     skin cancer scalp    [facility]erebral [facility] occlusion with [facility] infarction ([facility] [address] [facility]     CHF (congestive heart failure) ([facility] [facility]     Glaucoma     Hyperlipidemia     Hypertension        PAST SURGICAL HISTORY    Past Surgical History:   Procedure Laterality Date    ABDOMEN SURGERY      Hernia repair    BREAST SURGERY      benign mass removed    CORONARY ARTERY BYPASS GRAFT      HYSTERECTOMY         FAMILY HISTORY    History reviewed. No pertinent family history.     SOCIAL HISTORY    Social History     Tobacco Use    Smoking status: Former Smoker     Last attempt to quit:      Years since quittin.4    Smokeless tobacco: Never Used   Substance Use Topics    Alcohol use: Not on file    Drug use: Not on file       ALLERGIES    Allergies   Allergen Reactions    Codeine     Sulfa Antibiotics        MEDICATIONS    Current Outpatient Medications on File Prior to Encounter   Medication Sig Dispense Refill    brimonidine (ALPHAGAN P) 0.1 % SOLN Place 1 drop into both eyes 2 times daily      dorzolamide (TRUSOPT) 2 % ophthalmic solution Place 1 drop into both eyes 2 times daily      latanoprost (XALATAN) 0.005 % ophthalmic solution Place 1 drop into both eyes nightly      polyethyl glycol-propyl PM   Change in Wound Size % (l*w) -800 5/28/2019  3:15 PM   Wound Volume (cm^3) 0.45 cm^3 5/28/2019  3:15 PM   Wound Healing % -800 5/28/2019  3:15 PM   Wound Assessment Red;Granulation tissue 5/28/2019  3:15 PM   Drainage Amount Moderate 5/28/2019  3:15 PM   Drainage Description Tan 5/28/2019  3:15 PM   Odor Mild 5/28/2019  3:15 PM   Mckayla-wound Assessment Dry;Pink 5/28/2019  3:15 PM   [place]%Wound Bed 0 5/28/2019  3:15 PM   Red%Wound Bed 100 5/28/2019  3:15 PM   Yellow%Wound Bed 0 5/28/2019  3:15 PM   Black%Wound Bed 0 5/28/2019  3:15 PM   Purple%Wound Bed 0 5/28/2019  3:15 PM   Other%Wound Bed 0 5/28/2019  3:15 PM   Number of days: 49           Plan:       Discharge Treatment  silver alginate pading ,kerlix and to weat her circ aid over it Change q3 days RT in 1 week        Written Patient Discharge Instructions Given            Electronically signed by Radha Garcia MD on 5/28/2019 at 3:31 PM

## 2019-05-28 NOTE — PLAN OF CARE
Discharge instructions given. Patient verbalized understanding. Return to [facility] in 1 week.   Called/faxed orders to  [facility]

## 2019-06-04 ENCOUNTER — HOSPITAL ENCOUNTER (OUTPATIENT)
Dept: WOUND CARE | Age: 84
Discharge: HOME | End: 2019-06-04
Payer: MEDICARE

## 2019-06-04 VITALS — RESPIRATION RATE: 16 BRPM

## 2019-06-04 PROCEDURE — 29580 STRAPPING UNNA BOOT: CPT

## 2019-06-04 PROCEDURE — 99213 OFFICE O/P EST LOW 20 MIN: CPT | Performed by: SURGERY

## 2019-06-04 RX ORDER — LIDOCAINE 40 MG/G
CREAM TOPICAL ONCE
Status: DISCONTINUED | OUTPATIENT
Start: 2019-06-04 | End: 2019-06-05 | Stop reason: HOSPADM

## 2019-06-04 NOTE — PLAN OF CARE
Discharge instructions given. Patient verbalized understanding. Return to [address] [place] [address],[address] in 1 week.   Called/faxed orders to [facility] for skin sub auth

## 2019-06-04 NOTE — PROGRESS NOTES
Ruben Mccain  Progress Note       Yuil Recio  AGE: 80 y.o. GENDER: female  : 1926  TODAY'S DATE:  2019    Subjective:     Chief Complaint   Patient presents with    Wound Check     Foot wound follow up         HISTORY of PRESENT ILLNESS HPI     Yuli Recio is a 80 y.o. female who presents today for wound evaluation. History of Wound: venous ulcer    Wound Pain:  mild  Severity:  2 / 10   Wound Type:  venous  Modifying Factors:  venous stasis  Associated Signs/Symptoms:  none        PAST MEDICAL HISTORY        Diagnosis Date    Arthritis     CAD (coronary artery disease)     Cancer (HCC)     skin cancer scalp    Cerebral artery occlusion [facility] infarction ([facility] [address] [facility]     CHF (congestive heart failure) ([facility] [address] [facility]     Glaucoma     Hyperlipidemia     Hypertension        PAST SURGICAL HISTORY    Past Surgical History:   Procedure Laterality Date    ABDOMEN SURGERY      Hernia repair    BREAST SURGERY      benign mass removed    CORONARY ARTERY BYPASS GRAFT      HYSTERECTOMY         FAMILY HISTORY    History reviewed. No pertinent family history.     SOCIAL HISTORY    Social History     Tobacco Use    Smoking status: Former Smoker     Last attempt to quit:      Years since quittin.4    Smokeless tobacco: Never Used   Substance Use Topics    Alcohol use: Not on file    Drug use: Not on file       ALLERGIES    Allergies   Allergen Reactions    Codeine     Sulfa Antibiotics        MEDICATIONS    Current Outpatient Medications on File Prior to Encounter   Medication Sig Dispense Refill    brimonidine (ALPHAGAN P) 0.1 % SOLN Place 1 drop into both eyes 2 times daily      dorzolamide (TRUSOPT) 2 % ophthalmic solution Place 1 drop into both eyes 2 times daily      latanoprost (XALATAN) 0.005 % ophthalmic solution Place 1 drop into both eyes nightly      polyethyl glycol-propyl glycol 0.4-0.3 % (SYSTANE) 0.4-0.3 % ophthalmic solution 1 drop as needed for

## 2019-06-11 ENCOUNTER — HOSPITAL ENCOUNTER (OUTPATIENT)
Dept: WOUND CARE | Age: 84
Discharge: HOME | End: 2019-06-11
Payer: MEDICARE

## 2019-06-11 VITALS — RESPIRATION RATE: 16 BRPM | HEART RATE: 72 BPM | SYSTOLIC BLOOD PRESSURE: 119 MMHG | DIASTOLIC BLOOD PRESSURE: 70 MMHG

## 2019-06-11 PROCEDURE — 29580 STRAPPING UNNA BOOT: CPT

## 2019-06-11 PROCEDURE — 99213 OFFICE O/P EST LOW 20 MIN: CPT | Performed by: SURGERY

## 2019-06-11 RX ORDER — LIDOCAINE 40 MG/G
CREAM TOPICAL ONCE
Status: DISCONTINUED | OUTPATIENT
Start: 2019-06-11 | End: 2019-06-12 | Stop reason: HOSPADM

## 2019-06-11 NOTE — PLAN OF CARE
Discharge instructions given. Patient verbalized understanding. Return to [facility] in 1 week.   Instructed to make appt with her Dermatologist for biopsy

## 2019-06-11 NOTE — PLAN OF CARE
Compression Application   Below Knee    NAME:  Nehemias Michel  YOB: 1926  MEDICAL RECORD NUMBER:  [id number]  DATE:  6/11/2019       Applied moisturizing agent to dry skin as needed. Appied primary and secondary dressing as ordered     Applied  Unna Boot wrap from toes to knee overlapping each time. Applied cast padding and coban from toes to below the knee. Instructed patient/caregiver to keep dressing dry and intact. DO NOT REMOVE DRESSING. Instructed pt/family/caregiver to report excessive draining, loose bandage, wet dressing, severe pain or tingling in toes. Applied Compression dressing below the knee to Right lower leg(s)      Compression wrap(s) were applied per  Guidelines.      Electronically signed by Maykel Patterson RN on 6/11/2019 at 12:53 PM

## 2019-06-11 NOTE — PROGRESS NOTES
 econazole nitrate 1 % cream Apply topically 2 times daily Apply topically daily.  clobetasol (TEMOVATE) 0.05 % cream Apply topically as needed Apply topically 2 times daily.  traMADol (ULTRAM) 50 MG tablet Take 50 mg by mouth 2 times daily.  atenolol (TENORMIN) 50 MG tablet Take 25 mg by mouth daily       furosemide (LASIX) 40 MG tablet Take 20 mg by mouth 2 times daily       aspirin 81 MG tablet Take 81 mg by mouth daily      allopurinol (ZYLOPRIM) 100 MG tablet Take 100 mg by mouth daily      spironolactone (ALDACTONE) 25 MG tablet Take 12.5 mg by mouth daily      busPIRone (BUSPAR) 10 MG tablet Take 5 mg by mouth as needed (anxiety)       Omega-3 Fatty Acids (FISH OIL) 1000 MG CAPS Take 1,000 mg by mouth 3 times daily      Multiple Vitamins-Minerals (CENTRUM SILVER 50+WOMEN) TABS Take by mouth      vitamin B-12 (CYANOCOBALAMIN) 1000 MCG tablet Take 1,000 mcg by mouth daily      atorvastatin (LIPITOR) 40 MG tablet Take 40 mg by mouth daily       No current facility-administered medications on file prior to encounter. REVIEW OF SYSTEMS    Pertinent items are noted in HPI.       Objective:      /70   Pulse 72   Resp 16     PHYSICAL EXAM    General Appearance: alert and oriented to person, place and time, well-developed and well-nourished, in no acute distress  Skin: warm and dry, no rash or erythema  Head: normocephalic and atraumatic  Neck: neck supple and non tender without mass, no thyromegaly or thyroid nodules, no cervical lymphadenopathy   Abdomen: soft, non-tender, non-distended, normal bowel sounds, no masses or organomegaly  Ulcer clean    Assessment:     Patient Active Problem List   Diagnosis    Cellulitis of right foot    Cellulitis of left foot    Essential hypertension, malignant    Non-pressure chronic ulcer of right calf with fat layer exposed ([address]    Venous ulcer ([address]       Wound 04/09/19 Foot Right;Lateral #1 (Active)   Wound Image   5/21/2019 3:26 PM   Wound Venous 6/11/2019 11:35 AM   Wound Cleansed Wound cleanser 6/11/2019 11:35 AM   Wound Length (cm) 4 cm 6/11/2019 11:35 AM   Wound Width (cm) 10 cm 6/11/2019 11:35 AM   Wound Depth (cm) 0.1 cm 6/11/2019 11:35 AM   Wound Surface Area (cm^2) 40 cm^2 6/11/2019 11:35 AM   Change in Wound Size % (l*w) -7900 6/11/2019 11:35 AM   Wound Volume (cm^3) 4 cm^3 6/11/2019 11:35 AM   Wound Healing % -7900 6/11/2019 11:35 AM   Wound Assessment Granulation tissue; Red 6/11/2019 11:35 AM   Drainage Amount Moderate 6/11/2019 11:35 AM   Drainage Description Tan 6/11/2019 11:35 AM   Odor Strong 6/11/2019 11:35 AM   Mckayla-wound Assessment Pink 6/11/2019 11:35 AM   [place]%Wound Bed 100 6/11/2019 11:35 AM   Red%Wound Bed 0 6/11/2019 11:35 AM   Yellow%Wound Bed 0 6/11/2019 11:35 AM   Black%Wound Bed 0 6/11/2019 11:35 AM   Purple%Wound Bed 0 6/11/2019 11:35 AM   Other%Wound Bed 0 5/28/2019  3:15 PM   Number of days: 61    80year old female seen in follow up for an ulceration of the lateral aspect of the R foot. The ulcerations has not made recent progress. A skin substitute was denied by her insurance. Betamethasone was placed on the wound prior to placing a new Unna boot. Plan:     Recommended that the patient sees her dermatologist for evaluation and biopsy of the wound. Follow up with us in one week.     Discharge Treatment          Written Patient Discharge Instructions Given            Electronically signed by Lizeth Treviño MD on 6/11/2019 at 11:48 AM

## 2019-06-18 ENCOUNTER — HOSPITAL ENCOUNTER (OUTPATIENT)
Dept: WOUND CARE | Age: 84
Discharge: HOME | End: 2019-06-18
Payer: MEDICARE

## 2019-06-18 VITALS — TEMPERATURE: 97.5 F

## 2019-06-18 PROCEDURE — 29580 STRAPPING UNNA BOOT: CPT

## 2019-06-18 PROCEDURE — 99213 OFFICE O/P EST LOW 20 MIN: CPT | Performed by: SURGERY

## 2019-06-18 RX ORDER — LIDOCAINE 40 MG/G
CREAM TOPICAL ONCE
Status: DISCONTINUED | OUTPATIENT
Start: 2019-06-18 | End: 2019-06-19 | Stop reason: HOSPADM

## 2019-06-18 NOTE — PROGRESS NOTES
Ruben Mccain  Progress Note       Ruddy Bragg  AGE: 80 y.o. GENDER: female  : 1926  TODAY'S DATE:  2019    Subjective:     Chief Complaint   Patient presents with    Wound Check     bilateral feet         HISTORY of PRESENT ILLNESS HPI     Ruddy Bragg is a 80 y.o. female who presents today for wound evaluation. History of Wound: R lateral foot wound    Wound Pain:  none  Severity:  0 / 10   Wound Type:  venous  Modifying Factors:  venous stasis  Associated Signs/Symptoms:  none        PAST MEDICAL HISTORY        Diagnosis Date    Arthritis     CAD (coronary artery disease)     Cancer (HCC)     skin cancer scalp    [facility]bral [facility] occlusion [facility] infarction [facility]     CHF (congestive heart failure) (HCC)     Glaucoma     Hyperlipidemia     Hypertension        PAST SURGICAL HISTORY    Past Surgical History:   Procedure Laterality Date    ABDOMEN SURGERY      Hernia repair    BREAST SURGERY      benign mass removed    CORONARY ARTERY BYPASS GRAFT      HYSTERECTOMY         FAMILY HISTORY    No family history on file.     SOCIAL HISTORY    Social History     Tobacco Use    Smoking status: Former Smoker     Last attempt to quit:      Years since quittin.4    Smokeless tobacco: Never Used   Substance Use Topics    Alcohol use: Not on file    Drug use: Not on file       ALLERGIES    Allergies   Allergen Reactions    Codeine     Sulfa Antibiotics        MEDICATIONS    Current Outpatient Medications on File Prior to Encounter   Medication Sig Dispense Refill    brimonidine (ALPHAGAN P) 0.1 % SOLN Place 1 drop into both eyes 2 times daily      dorzolamide (TRUSOPT) 2 % ophthalmic solution Place 1 drop into both eyes 2 times daily      latanoprost (XALATAN) 0.005 % ophthalmic solution Place 1 drop into both eyes nightly      polyethyl glycol-propyl glycol 0.4-0.3 % (SYSTANE) 0.4-0.3 % ophthalmic solution 1 drop as needed for Dry Eyes      econazole nitrate 1 % cream Apply topically 2 times daily Apply topically daily.  clobetasol (TEMOVATE) 0.05 % cream Apply topically as needed Apply topically 2 times daily.  traMADol (ULTRAM) 50 MG tablet Take 50 mg by mouth 2 times daily.  atenolol (TENORMIN) 50 MG tablet Take 25 mg by mouth daily       furosemide (LASIX) 40 MG tablet Take 20 mg by mouth 2 times daily       aspirin 81 MG tablet Take 81 mg by mouth daily      allopurinol (ZYLOPRIM) 100 MG tablet Take 100 mg by mouth daily      spironolactone (ALDACTONE) 25 MG tablet Take 12.5 mg by mouth daily      busPIRone (BUSPAR) 10 MG tablet Take 5 mg by mouth as needed (anxiety)       Omega-3 Fatty Acids (FISH OIL) 1000 MG CAPS Take 1,000 mg by mouth 3 times daily      Multiple Vitamins-Minerals (CENTRUM SILVER 50+WOMEN) TABS Take by mouth      vitamin B-12 (CYANOCOBALAMIN) 1000 MCG tablet Take 1,000 mcg by mouth daily      atorvastatin (LIPITOR) 40 MG tablet Take 40 mg by mouth daily       No current facility-administered medications on file prior to encounter. REVIEW OF SYSTEMS    Pertinent items are noted in HPI.       Objective:      Temp 97.5 °F (36.4 °C)     PHYSICAL EXAM    General Appearance: alert and oriented to person, place and time, well-developed and well-nourished, in no acute distress  Skin: warm and dry, no rash or erythema  Head: normocephalic and atraumatic  Neck: neck supple and non tender without mass, no thyromegaly or thyroid nodules, no cervical lymphadenopathy   Abdomen: soft, non-tender, non-distended, normal bowel sounds, no masses or organomegaly  Several small clean ulcerations of the R pretibial region  Clean ulceration on the lateral aspect of the R foot     Assessment:     Patient Active Problem List   Diagnosis    Cellulitis of right foot    Cellulitis of left foot    Essential hypertension, malignant    Non-pressure chronic ulcer of right calf with fat layer exposed ([address]    Venous ulcer ([address]       Wound 04/09/19 Foot Right;Lateral #1 (Active)   Wound Image   5/21/2019  3:26 PM   Wound Venous 6/18/2019 11:35 AM   Wound Cleansed Wound cleanser 6/18/2019 11:35 AM   Wound Length (cm) 0 cm 6/18/2019 11:35 AM   Wound Width (cm) 0 cm 6/18/2019 11:35 AM   Wound Depth (cm) 0 cm 6/18/2019 11:35 AM   Wound Surface Area (cm^2) 0 cm^2 6/18/2019 11:35 AM   Change in Wound Size % (l*w) 100 6/18/2019 11:35 AM   Wound Volume (cm^3) 0 cm^3 6/18/2019 11:35 AM   Wound Healing % 100 6/18/2019 11:35 AM   Wound Assessment Granulation tissue 6/18/2019 11:35 AM   Drainage Amount None 6/18/2019 11:35 AM   Drainage Description Tan 6/11/2019 11:35 AM   Odor Strong 6/11/2019 11:35 AM   Mckayla-wound Assessment Pink 6/18/2019 11:35 AM   [place]%Wound Bed 100 6/18/2019 11:35 AM   Red%Wound Bed 0 6/18/2019 11:35 AM   Yellow%Wound Bed 0 6/18/2019 11:35 AM   Black%Wound Bed 0 6/18/2019 11:35 AM   Purple%Wound Bed 0 6/11/2019 11:35 AM   Other%Wound Bed 0 6/18/2019 11:35 AM   Number of days: 79    80year old female seen in follow-up for venous ulcerations. She has several small ulcerations on the right pretibial region and a larger area on the lateral aspect of the right foot. Last week, we placed betamethasone cream on the lateral foot wound beneath the Unna boot. This week, the ulceration has improved significantly. Betamethasone was placed on all of the ulcerations and then a new Unna boot was placed. Plan:     Follow-up in 1 week.   Discharge Treatment          Written Patient Discharge Instructions Given            Electronically signed by Oren Yu MD on 6/18/2019 at 11:50 AM

## 2019-06-25 ENCOUNTER — HOSPITAL ENCOUNTER (OUTPATIENT)
Dept: WOUND CARE | Age: 84
Discharge: HOME | End: 2019-06-25
Payer: MEDICARE

## 2019-06-25 VITALS — RESPIRATION RATE: 16 BRPM | DIASTOLIC BLOOD PRESSURE: 65 MMHG | HEART RATE: 74 BPM | SYSTOLIC BLOOD PRESSURE: 121 MMHG

## 2019-06-25 PROCEDURE — 99212 OFFICE O/P EST SF 10 MIN: CPT | Performed by: SURGERY

## 2019-06-25 PROCEDURE — 99212 OFFICE O/P EST SF 10 MIN: CPT

## 2019-06-25 RX ORDER — LIDOCAINE 40 MG/G
CREAM TOPICAL ONCE
Status: DISCONTINUED | OUTPATIENT
Start: 2019-06-25 | End: 2019-06-26 | Stop reason: HOSPADM

## 2019-06-25 NOTE — PROGRESS NOTES
Ruben Mccain  Progress Note       Daisy Hoang  AGE: 80 y.o. GENDER: female  : 1926  TODAY'S DATE:  2019    Subjective:     Chief Complaint   Patient presents with    Wound Check     Right foot wound follow up         HISTORY of PRESENT ILLNESS HPI     Daisy Hoang is a 80 y.o. female who presents today for wound evaluation. History of Wound: venous ulcer R leg    Wound Pain:  none  Severity:  0 / 10   Wound Type:  venous  Modifying Factors:  venous stasis  Associated Signs/Symptoms:  none        PAST MEDICAL HISTORY        Diagnosis Date    Arthritis     CAD (coronary artery disease)     Cancer (HCC)     skin cancer scalp    [facility] occlusion [facility]     CHF (congestive heart failure) ([facility]     Glaucoma     Hyperlipidemia     Hypertension        PAST SURGICAL HISTORY    Past Surgical History:   Procedure Laterality Date    ABDOMEN SURGERY      Hernia repair    BREAST SURGERY      benign mass removed    CORONARY ARTERY BYPASS GRAFT      HYSTERECTOMY         FAMILY HISTORY    History reviewed. No pertinent family history.     SOCIAL HISTORY    Social History     Tobacco Use    Smoking status: Former Smoker     Last attempt to quit:      Years since quittin.5    Smokeless tobacco: Never Used   Substance Use Topics    Alcohol use: Not on file    Drug use: Not on file       ALLERGIES    Allergies   Allergen Reactions    Codeine     Sulfa Antibiotics        MEDICATIONS    Current Outpatient Medications on File Prior to Encounter   Medication Sig Dispense Refill    brimonidine (ALPHAGAN P) 0.1 % SOLN Place 1 drop into both eyes 2 times daily      dorzolamide (TRUSOPT) 2 % ophthalmic solution Place 1 drop into both eyes 2 times daily      latanoprost (XALATAN) 0.005 % ophthalmic solution Place 1 drop into both eyes nightly      polyethyl glycol-propyl glycol 0.4-0.3 % (SYSTANE) 0.4-0.3 % ophthalmic solution 1 drop as needed for Dry Eyes      econazole nitrate 1 % cream Apply topically 2 times daily Apply topically daily.  clobetasol (TEMOVATE) 0.05 % cream Apply topically as needed Apply topically 2 times daily.  traMADol (ULTRAM) 50 MG tablet Take 50 mg by mouth 2 times daily.  atenolol (TENORMIN) 50 MG tablet Take 25 mg by mouth daily       furosemide (LASIX) 40 MG tablet Take 20 mg by mouth 2 times daily       aspirin 81 MG tablet Take 81 mg by mouth daily      allopurinol (ZYLOPRIM) 100 MG tablet Take 100 mg by mouth daily      spironolactone (ALDACTONE) 25 MG tablet Take 12.5 mg by mouth daily      busPIRone (BUSPAR) 10 MG tablet Take 5 mg by mouth as needed (anxiety)       Omega-3 Fatty Acids (FISH OIL) 1000 MG CAPS Take 1,000 mg by mouth 3 times daily      Multiple Vitamins-Minerals (CENTRUM SILVER 50+WOMEN) TABS Take by mouth      vitamin B-12 (CYANOCOBALAMIN) 1000 MCG tablet Take 1,000 mcg by mouth daily      atorvastatin (LIPITOR) 40 MG tablet Take 40 mg by mouth daily       No current facility-administered medications on file prior to encounter. REVIEW OF SYSTEMS    Pertinent items are noted in HPI.       Objective:      /65   Pulse 74   Resp 16     PHYSICAL EXAM    General Appearance: alert and oriented to person, place and time, well-developed and well-nourished, in no acute distress  Skin: warm and dry, no rash or erythema  Head: normocephalic and atraumatic  Neck: neck supple and non tender without mass, no thyromegaly or thyroid nodules, no cervical lymphadenopathy   Abdomen: soft, non-tender, non-distended, normal bowel sounds, no masses or organomegaly  R Foot ulcerations healed  Small open right pretibial ulceration      Assessment:     Patient Active Problem List   Diagnosis    Cellulitis of right foot    Cellulitis of left foot    Essential hypertension, malignant    Non-pressure chronic ulcer of right calf with fat layer exposed ([address]    Venous ulcer ([address] Wound 04/09/19 Foot Right;Lateral #1 (Active)   Wound Image   6/25/2019 10:58 AM   Wound Venous 6/18/2019 11:35 AM   Wound Cleansed Wound cleanser 6/18/2019 11:35 AM   Wound Length (cm) 0 cm 6/25/2019 10:58 AM   Wound Width (cm) 0 cm 6/25/2019 10:58 AM   Wound Depth (cm) 0 cm 6/25/2019 10:58 AM   Wound Surface Area (cm^2) 0 cm^2 6/25/2019 10:58 AM   Change in Wound Size % (l*w) 100 6/25/2019 10:58 AM   Wound Volume (cm^3) 0 cm^3 6/25/2019 10:58 AM   Wound Healing % 100 6/25/2019 10:58 AM   Wound Assessment Epithelialization 6/25/2019 10:58 AM   Drainage Amount None 6/25/2019 10:58 AM   Drainage Description Tan 6/11/2019 11:35 AM   Odor Strong 6/11/2019 11:35 AM   Mckayla-wound Assessment Pink 6/18/2019 11:35 AM   [employer]%Wound Bed 100 6/25/2019 10:58 AM   Red%Wound Bed 0 6/25/2019 10:58 AM   Yellow%Wound Bed 0 6/25/2019 10:58 AM   Black%Wound Bed 0 6/25/2019 10:58 AM   Purple%Wound Bed 0 6/25/2019 10:58 AM   Other%Wound Bed 0 6/18/2019 11:35 AM   Number of days: 68    80year-old female seen in follow-up for venous ulcerations of the right lateral foot and right pretibial region. The right foot ulceration has completely healed. There is a very small remaining right pretibial ulceration. Plan:     Cover the pretibial ulceration with Adaptic followed by a light Kerlix dressing. They will then placed the CircAid on the leg for compression. Follow-up in 2 weeks.     Discharge Treatment          Written Patient Discharge Instructions Given            Electronically signed by Jada Sam MD on 6/25/2019 at 11:16 AM

## 2019-06-25 NOTE — PLAN OF CARE
Discharge instructions given. Patient verbalized understanding. Return to [address] [place] [address],[address] in 2 weeks.

## 2019-07-03 ENCOUNTER — HOSPITAL ENCOUNTER (OUTPATIENT)
Dept: WOUND CARE | Age: 84
Discharge: HOME | End: 2019-07-03
Payer: MEDICARE

## 2019-07-03 PROCEDURE — 99212 OFFICE O/P EST SF 10 MIN: CPT | Performed by: SURGERY

## 2019-07-03 PROCEDURE — 99212 OFFICE O/P EST SF 10 MIN: CPT

## 2019-07-03 RX ORDER — BETAMETHASONE DIPROPIONATE 0.5 MG/G
CREAM TOPICAL
Qty: 1 TUBE | Refills: 0 | Status: SHIPPED | OUTPATIENT
Start: 2019-07-03

## 2019-07-03 RX ORDER — LIDOCAINE 40 MG/G
CREAM TOPICAL ONCE
Status: DISCONTINUED | OUTPATIENT
Start: 2019-07-03 | End: 2019-07-04 | Stop reason: HOSPADM

## 2019-07-03 NOTE — PROGRESS NOTES
Take 25 mg by mouth daily       furosemide (LASIX) 40 MG tablet Take 20 mg by mouth 2 times daily       aspirin 81 MG tablet Take 81 mg by mouth daily      allopurinol (ZYLOPRIM) 100 MG tablet Take 100 mg by mouth daily      spironolactone (ALDACTONE) 25 MG tablet Take 12.5 mg by mouth daily      busPIRone (BUSPAR) 10 MG tablet Take 5 mg by mouth as needed (anxiety)       Omega-3 Fatty Acids (FISH OIL) 1000 MG CAPS Take 1,000 mg by mouth 3 times daily      Multiple Vitamins-Minerals (CENTRUM SILVER 50+WOMEN) TABS Take by mouth      vitamin B-12 (CYANOCOBALAMIN) 1000 MCG tablet Take 1,000 mcg by mouth daily      atorvastatin (LIPITOR) 40 MG tablet Take 40 mg by mouth daily       Current Facility-Administered Medications   Medication Dose Route Frequency Provider Last Rate Last Dose    lidocaine (LMX) 4 % cream   Topical Once Macarena Menendez MD            Allergies   Allergen Reactions    Codeine     Sulfa Antibiotics              REVIEW OF SYSTEMS    Pertinent items from the review of systems are discussed in the HPI; the remainder of the ROS was reviewed and is negative. Objective: There were no vitals taken for this visit. PHYSICAL EXAM    General Appearance: alert and oriented to person, place and time, well developed and well- nourished, in no acute distress  Skin: warm and dry, no rash or erythema  Head: normocephalic and atraumatic  Right leg wound has healed, no additional wounds      Assessment:     Patient Active Problem List   Diagnosis    Cellulitis of right foot    Cellulitis of left foot    Essential hypertension, malignant    Non-pressure chronic ulcer of right calf with fat layer exposed ([address]    Venous ulcer ([address]              Plan:     The nature of the patient's condition was explained in depth.  The patient was informed that their compliance to the treatment plan is paramount to successful healing and prevention of further ulceration and/or infection     Discharge Treatment     Wound has healed, Betamethasone cream to irritated areas, small dry dressing, Circaid. Circiads need to be removed & replaced each day. Follow with wound clinic as needed    Written Patient Discharge Instructions Given           Enedelia Darby.  Hannah Roper MD, FACS  7/3/2019  5:44 PM